# Patient Record
Sex: MALE | Race: WHITE | NOT HISPANIC OR LATINO | Employment: UNEMPLOYED | URBAN - METROPOLITAN AREA
[De-identification: names, ages, dates, MRNs, and addresses within clinical notes are randomized per-mention and may not be internally consistent; named-entity substitution may affect disease eponyms.]

---

## 2019-09-18 ENCOUNTER — HOSPITAL ENCOUNTER (EMERGENCY)
Facility: HOSPITAL | Age: 24
Discharge: HOME | End: 2019-09-18
Attending: EMERGENCY MEDICINE | Admitting: EMERGENCY MEDICINE
Payer: COMMERCIAL

## 2019-09-18 VITALS
SYSTOLIC BLOOD PRESSURE: 132 MMHG | RESPIRATION RATE: 18 BRPM | OXYGEN SATURATION: 98 % | TEMPERATURE: 100.2 F | DIASTOLIC BLOOD PRESSURE: 74 MMHG | HEART RATE: 89 BPM

## 2019-09-18 DIAGNOSIS — Z00.8 ENCOUNTER FOR PSYCHOLOGICAL EVALUATION: Primary | ICD-10-CM

## 2019-09-18 DIAGNOSIS — F25.9 SCHIZO-AFFECTIVE SCHIZOPHRENIA (HCC): ICD-10-CM

## 2019-09-18 LAB
AMPHETAMINES SERPL QL SCN: NEGATIVE
BARBITURATES UR QL: NEGATIVE
BENZODIAZ UR QL: NEGATIVE
COCAINE UR QL: NEGATIVE
METHADONE UR QL: NEGATIVE
OPIATES UR QL SCN: NEGATIVE
PCP UR QL: NEGATIVE
THC UR QL: NEGATIVE

## 2019-09-18 PROCEDURE — 99284 EMERGENCY DEPT VISIT MOD MDM: CPT

## 2019-09-18 PROCEDURE — 99285 EMERGENCY DEPT VISIT HI MDM: CPT | Performed by: EMERGENCY MEDICINE

## 2019-09-18 PROCEDURE — 80307 DRUG TEST PRSMV CHEM ANLYZR: CPT | Performed by: EMERGENCY MEDICINE

## 2019-09-18 PROCEDURE — 82075 ASSAY OF BREATH ETHANOL: CPT | Performed by: EMERGENCY MEDICINE

## 2019-09-18 RX ORDER — QUETIAPINE FUMARATE 300 MG/1
300 TABLET, FILM COATED ORAL
COMMUNITY
Start: 2019-06-07

## 2019-09-18 RX ORDER — CITALOPRAM 20 MG/1
30 TABLET ORAL DAILY
COMMUNITY
Start: 2019-06-07
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
## 2021-06-10 ENCOUNTER — OFFICE VISIT (OUTPATIENT)
Dept: FAMILY MEDICINE CLINIC | Facility: CLINIC | Age: 26
End: 2021-06-10
Payer: COMMERCIAL

## 2021-06-10 DIAGNOSIS — Z20.822 COUGH WITH EXPOSURE TO COVID-19 VIRUS: Primary | ICD-10-CM

## 2021-06-10 DIAGNOSIS — R05.8 COUGH WITH EXPOSURE TO COVID-19 VIRUS: Primary | ICD-10-CM

## 2021-06-10 PROBLEM — Z72.0 TOBACCO ABUSE: Status: ACTIVE | Noted: 2020-05-27

## 2021-06-10 PROCEDURE — 3725F SCREEN DEPRESSION PERFORMED: CPT | Performed by: FAMILY MEDICINE

## 2021-06-10 PROCEDURE — U0005 INFEC AGEN DETEC AMPLI PROBE: HCPCS | Performed by: FAMILY MEDICINE

## 2021-06-10 PROCEDURE — 99214 OFFICE O/P EST MOD 30 MIN: CPT | Performed by: FAMILY MEDICINE

## 2021-06-10 PROCEDURE — U0003 INFECTIOUS AGENT DETECTION BY NUCLEIC ACID (DNA OR RNA); SEVERE ACUTE RESPIRATORY SYNDROME CORONAVIRUS 2 (SARS-COV-2) (CORONAVIRUS DISEASE [COVID-19]), AMPLIFIED PROBE TECHNIQUE, MAKING USE OF HIGH THROUGHPUT TECHNOLOGIES AS DESCRIBED BY CMS-2020-01-R: HCPCS | Performed by: FAMILY MEDICINE

## 2021-06-10 NOTE — PROGRESS NOTES
Assessment/Plan:   Recommend supportive care fluids and rest   Recommend self quarantine till his test results are known  I gave him a note to stay home from work  Follow-up here as needed  Follow-up emergency department should he become significantly short of breath or have any other serious symptoms  Diagnoses and all orders for this visit:    Cough with exposure to COVID-19 virus  -     Novel Coronavirus (COVID-19), PCR [facility] Collected in Office          Subjective:     Patient ID: Fer Cole is a 30 y o  male  Patient presents with:  COVID-19: fever, sore throat, congestion, needs note for work    Symptoms started about 2 days ago  He initially had a fever but that has improved  Review of Systems   Constitutional: Negative  Negative for fatigue and fever  HENT: Positive for sore throat  Negative for congestion  Eyes: Negative  Respiratory: Negative  Negative for cough and shortness of breath  Cardiovascular: Negative  Gastrointestinal: Negative  Endocrine: Negative  Genitourinary: Negative  Musculoskeletal: Negative  Skin: Negative  Allergic/Immunologic: Negative  Neurological: Negative  Hematological: Negative  Psychiatric/Behavioral: Negative  All other systems reviewed and are negative  Objective:     Physical Exam  Vitals signs and nursing note reviewed  Constitutional:       Appearance: He is well-developed  HENT:      Head: Normocephalic and atraumatic  Right Ear: External ear normal       Left Ear: External ear normal       Nose: Nose normal    Eyes:      Conjunctiva/sclera: Conjunctivae normal       Pupils: Pupils are equal, round, and reactive to light  Neck:      Musculoskeletal: Normal range of motion and neck supple  Cardiovascular:      Rate and Rhythm: Normal rate and regular rhythm  Heart sounds: Normal heart sounds  Pulmonary:      Effort: Pulmonary effort is normal       Breath sounds: Normal breath sounds  Abdominal:      General: Bowel sounds are normal       Palpations: Abdomen is soft  Musculoskeletal: Normal range of motion  Skin:     General: Skin is warm and dry  Neurological:      Mental Status: He is alert and oriented to person, place, and time  Deep Tendon Reflexes: Reflexes are normal and symmetric     Psychiatric:         Behavior: Behavior normal

## 2021-06-10 NOTE — LETTER
Gloria 10, 2021     Patient: Pricilla Cole   YOB: 1995   Date of Visit: 6/10/2021       To Whom it May Concern:    Pricilla Cole is under my professional care  He was seen in my office on 6/10/2021  He may return to work on Saturday June 12, 2021 pending his COVID test results       If you have any questions or concerns, please don't hesitate to call           Sincerely,          Yolie Luis DO        CC: No Recipients

## 2021-06-11 ENCOUNTER — TELEPHONE (OUTPATIENT)
Dept: FAMILY MEDICINE CLINIC | Facility: CLINIC | Age: 26
End: 2021-06-11

## 2021-06-11 LAB — SARS-COV-2 RNA RESP QL NAA+PROBE: NEGATIVE

## 2025-06-01 ENCOUNTER — TELEPHONE (OUTPATIENT)
Dept: OTHER | Facility: OTHER | Age: 30
End: 2025-06-01

## 2025-07-12 ENCOUNTER — HOSPITAL ENCOUNTER (EMERGENCY)
Facility: HOSPITAL | Age: 30
End: 2025-07-13
Attending: EMERGENCY MEDICINE | Admitting: EMERGENCY MEDICINE
Payer: MEDICARE

## 2025-07-12 DIAGNOSIS — F25.9 SCHIZOAFFECTIVE DISORDER (HCC): Primary | ICD-10-CM

## 2025-07-12 LAB — ETHANOL EXG-MCNC: 0 MG/DL

## 2025-07-12 PROCEDURE — 99283 EMERGENCY DEPT VISIT LOW MDM: CPT

## 2025-07-12 PROCEDURE — 82075 ASSAY OF BREATH ETHANOL: CPT | Performed by: PHYSICIAN ASSISTANT

## 2025-07-12 PROCEDURE — 99285 EMERGENCY DEPT VISIT HI MDM: CPT | Performed by: PHYSICIAN ASSISTANT

## 2025-07-12 RX ORDER — PALIPERIDONE PALMITATE 234 MG/1.5ML
1.5 INJECTION INTRAMUSCULAR ONCE
COMMUNITY
Start: 2025-03-04

## 2025-07-12 NOTE — ED NOTES
"Pt was provided with dinner tray. Pt stated \"there is heroin in your food, I'm not eating that\"      Princess Avelar RN  07/12/25 5872    "

## 2025-07-12 NOTE — ED NOTES
Spoke with patients mother, Jennie Toure to give an update on her son. Pt mother would like to be notified when the pt receives placement. Call back number is [phone].     Princess Avelar RN  07/12/25 7250
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
## 2025-07-12 NOTE — ED CARE HANDOFF
Emergency Department Sign Out Note        Sign out and transfer of care from Brionna Jones PA-C. See Separate Emergency Department note.     The patient, Barrie Toure, was evaluated by the previous provider for psych eval.    Workup Completed:  N/A.    ED Course / Workup Pending (followup):  201 was signed awaiting placement. Patient signed out to FORREST Davis.        No data recorded                          ED Course as of 07/12/25 2039   Sat Jul 12, 2025   1906 Reported missing, missing for 12 days, brought in by [employer] today.  He has done this before, has not been taking any of his meds.  201 signed, awaiting placement.     Procedures  Medical Decision Making  Amount and/or Complexity of Data Reviewed  Labs: ordered.    Risk  Decision regarding hospitalization.            Disposition  Final diagnoses:   None     ED Disposition       ED Disposition   Transfer to Behavioral Health Condition   --    Date/Time   Sat Jul 12, 2025  7:18 PM    Comment   Barrie Toure should be transferred out to [facility] and has been medically cleared.               Follow-up Information    None       Patient's Medications   Discharge Prescriptions    No medications on file     No discharge procedures on file.       ED Provider  Electronically Signed by     Zaida Boswell PA-C  07/12/25 2039

## 2025-07-13 VITALS
SYSTOLIC BLOOD PRESSURE: 115 MMHG | RESPIRATION RATE: 17 BRPM | HEART RATE: 63 BPM | WEIGHT: 128.75 LBS | OXYGEN SATURATION: 98 % | DIASTOLIC BLOOD PRESSURE: 62 MMHG | TEMPERATURE: 98.1 F

## 2025-07-13 LAB
AMPHETAMINES SERPL QL SCN: NEGATIVE
BARBITURATES UR QL: NEGATIVE
BENZODIAZ UR QL: NEGATIVE
COCAINE UR QL: NEGATIVE
FENTANYL UR QL SCN: NEGATIVE
HYDROCODONE UR QL SCN: NEGATIVE
METHADONE UR QL: NEGATIVE
OPIATES UR QL SCN: NEGATIVE
OXYCODONE+OXYMORPHONE UR QL SCN: NEGATIVE
PCP UR QL: NEGATIVE
THC UR QL: NEGATIVE

## 2025-07-13 PROCEDURE — 80307 DRUG TEST PRSMV CHEM ANLYZR: CPT | Performed by: PHYSICIAN ASSISTANT

## 2025-07-13 NOTE — ED CARE HANDOFF
Emergency Department Sign Out Note        Sign out and transfer of care from GONZALO Collins. See Separate Emergency Department note.     The patient, Barrie Toure, was evaluated by the previous provider for psych.    Workup Completed:  As completed    ED Course / Workup Pending (followup):  201        No data recorded                             Procedures  Medical Decision Making  Amount and/or Complexity of Data Reviewed  Labs: ordered.    Risk  Decision regarding hospitalization.            Disposition  Final diagnoses:   Schizoaffective disorder (HCC)     Time reflects when diagnosis was documented in both MDM as applicable and the Disposition within this note       Time User Action Codes Description Comment    7/12/2025  8:43 PM Susan Collins Add [F25.9] Schizoaffective disorder (HCC)           ED Disposition       ED Disposition   Transfer to Behavioral Health    Condition   --    Date/Time   Sat Jul 12, 2025  7:18 PM    Comment   Barrie Toure should be transferred out to [facility] and has been medically cleared.               Follow-up Information    None       Patient's Medications   Discharge Prescriptions    No medications on file     No discharge procedures on file.       ED Provider  Electronically Signed by     Gagandeep Johnson MD  07/13/25 0656
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
## 2025-07-13 NOTE — ED NOTES
Per Lilliam from [facility], a bed is still available for the patient.  Primary payor is Medicare. The patient is not enrolled in a managed plan. PALAK Dunn is the secondary payor. No prior authorization is necessary at this time.

## 2025-07-13 NOTE — ED NOTES
Mom, tanisha called at this time [phone], no answer-message left    Also tried aunt leigh ann at [phone]- chelsey Warner RN  07/13/25 5641

## 2025-07-13 NOTE — ED NOTES
When asked if he had made a decision regarding [facility] [place], patient stated he had not. He  again stated he was told he would be held here for 72 hours. Explained again that he needs to be actually admitted to a unit prior to signing a 72 hour notice for discharge, and that that is not a guarantee of discharge; that it is still based on the psychiatrist's recommendation. Offered [facility] as well. Continues to stated he will think about it. Continues as irritable. Lunch tray provided.

## 2025-07-13 NOTE — ED NOTES
"Patient awake - came to doorway to ask for a blanket because he was cold. Comfort measures offered and patient requested a sandwich and juice - same given. Patient reports that he \"slept like a rock!\" Patient is oriented to person, place (psychiatric hospital) and time. He denies suicidal or homicidal ideation and he states that he is not hearing or seeing things. He was cooperative with care measures. Was asking to take a shower - informed of need to wait until the morning - is agreeable to same. Presently he is eating and watching television.     Margarita Agarwal RN  07/13/25 0109    "

## 2025-07-13 NOTE — ED NOTES
Per Umberto from Intake, there are no beds available at this time. A bed search will be necessary.

## 2025-07-13 NOTE — ED NOTES
ED Crisis met with patient regarding acceptance at [facility], provided he is willing to go there. He was under the impression that he can leave after 72 hours. Reiterated the terms of a 201 and reminded him that a 302 was the alternative. He stated he would need to wake up completely and think about it. Will reapproach the patient shortly.

## 2025-07-13 NOTE — ED NOTES
Pt provided with warm blankets and lights dimmed for comfort. Pt resting comfortably. No other needs at this time.      Princess Avelar RN  07/12/25 2018

## 2025-07-13 NOTE — ED CARE HANDOFF
Emergency Department Sign Out Note        Sign out and transfer of care from Dr. Johnson. See Separate Emergency Department note.     The patient, Barrie Toure, was evaluated by the previous provider for .    Workup Completed:  201    ED Course / Workup Pending (followup):  Pending placement        No data recorded                             Procedures  Medical Decision Making  Amount and/or Complexity of Data Reviewed  Labs: ordered.    Risk  Decision regarding hospitalization.            Disposition  Final diagnoses:   Schizoaffective disorder (HCC)     Time reflects when diagnosis was documented in both MDM as applicable and the Disposition within this note       Time User Action Codes Description Comment    7/12/2025  8:43 PM Susan Collins Add [F25.9] Schizoaffective disorder (HCC)           ED Disposition       ED Disposition   Transfer to Behavioral Health    Condition   --    Date/Time   Sat Jul 12, 2025  7:18 PM    Comment   Barrie Toure should be transferred out to D and has been medically cleared.               Follow-up Information    None       Patient's Medications   Discharge Prescriptions    No medications on file     No discharge procedures on file.       ED Provider  Electronically Signed by     Titi Lee MD  07/13/25

## 2025-07-13 NOTE — EMTALA/ACUTE CARE TRANSFER
[facility] EMERGENCY DEPARTMENT  [address] [facility] [address]  [phone]  Dept: [phone]      EMTALA TRANSFER CONSENT    NAME Barrie Toure                                         1995                              MRN [id number]    I have been informed of my rights regarding examination, treatment, and transfer   by Dr. Titi Lee MD    Benefits: Continuity of care    Risks: Potential for delay in receiving treatment      Consent for Transfer:  I acknowledge that my medical condition has been evaluated and explained to me by the emergency department physician or other qualified medical person and/or my attending physician, who has recommended that I be transferred to the service of  Accepting Physician: Sascha Paul MD at Accepting Facility Name, City & State : [address] [address]. The above potential benefits of such transfer, the potential risks associated with such transfer, and the probable risks of not being transferred have been explained to me, and I fully understand them.  The doctor has explained that, in my case, the benefits of transfer outweigh the risks.  I agree to be transferred.    I authorize the performance of emergency medical procedures and treatments upon me in both transit and upon arrival at the receiving facility.  Additionally, I authorize the release of any and all medical records to the receiving facility and request they be transported with me, if possible.  I understand that the safest mode of transportation during a medical emergency is an ambulance and that the Hospital advocates the use of this mode of transport. Risks of traveling to the receiving facility by car, including absence of medical control, life sustaining equipment, such as oxygen, and medical personnel has been explained to me and I fully understand them.    (RICARDO CORRECT BOX BELOW)  [  ]  I consent to the stated transfer and to be  transported by ambulance/helicopter.  [  ]  I consent to the stated transfer, but refuse transportation by ambulance and accept full responsibility for my transportation by car.  I understand the risks of non-ambulance transfers and I exonerate the Hospital and its staff from any deterioration in my condition that results from this refusal.    X___________________________________________    DATE  25  TIME________  Signature of patient or legally responsible individual signing on patient behalf           RELATIONSHIP TO PATIENT_________________________          Provider Certification    NAME Barrie Toure                                         1995                              MRN [id number]    A medical screening exam was performed on the above named patient.  Based on the examination:    Condition Necessitating Transfer The encounter diagnosis was Schizoaffective disorder (HCC).    Patient Condition: The patient has been stabilized such that within reasonable medical probability, no material deterioration of the patient condition or the condition of the unborn child(wilfred) is likely to result from the transfer    Reason for Transfer: No bed available at level of patient's needs    Transfer Requirements: Facility [facility], [address] [place] [id number]   Space available and qualified personnel available for treatment as acknowledged by Sofía/ [phone]  Agreed to accept transfer and to provide appropriate medical treatment as acknowledged by       Sascha Paul MD  Appropriate medical records of the examination and treatment of the patient are provided at the time of transfer   STAFF INITIAL WHEN COMPLETED _______  Transfer will be performed by qualified personnel from [facility]/ [phone]  and appropriate transfer equipment as required, including the use of necessary and appropriate life support measures.    Provider Certification: I have examined the patient and explained the  following risks and benefits of being transferred/refusing transfer to the patient/family:  General risk, such as traffic hazards, adverse weather conditions, rough terrain or turbulence, possible failure of equipment (including vehicle or aircraft), or consequences of actions of persons outside the control of the transport personnel      Based on these reasonable risks and benefits to the patient and/or the unborn child(wilfred), and based upon the information available at the time of the patient’s examination, I certify that the medical benefits reasonably to be expected from the provision of appropriate medical treatments at another medical facility outweigh the increasing risks, if any, to the individual’s medical condition, and in the case of labor to the unborn child, from effecting the transfer.    X____________________________________________ DATE 07/13/25        TIME_______      ORIGINAL - SEND TO MEDICAL RECORDS   COPY - SEND WITH PATIENT DURING TRANSFER

## 2025-07-13 NOTE — ED NOTES
Name: NAWAF PAYNE   Recipient ID: [id number]   YOB: 1995   Gender: Male           Eligibility Summary    Type Name   Medicaid Category: MHX  Program Status: 00  Service Program: EPOMS-[facility] Based Funding Only - Non-Medic   [id number]   Medicaid Category: PH  Program Status: 80  Service Program: [id number]-ADULT   Other or Additional Payor MEDICARE PART B   Other or Additional Payor MEDICARE PART A
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
## 2025-07-13 NOTE — ED NOTES
Patient noted to be sleeping with easy non labored respirations on virtual monitoring. No apparent discomfort or distress. Did not awaken patient at this time for vital sign or BH reassessment as allowing patient to get rest as is unclear how much sleep he has gotten over the past few weeks. Will assess when awake.     Margarita Agarwal RN  07/12/25 [id number]

## 2025-07-13 NOTE — ED NOTES
EMTALA and PMN forms completed and signed.  PMN form emailed to [email or web address].  Transfer envelope is completed.

## 2025-07-13 NOTE — ED NOTES
Patient is accepted at [facility].  Patient is accepted by Sascha Paul MD, per Светлана.   Patient can arrive any time.   Transport requested through [employer].    Transportation is arranged with: [facility].  Transportation is scheduled for 1900.   Lilliam from Admissions is aware.    Nurse report is not required.

## 2025-07-13 NOTE — ED NOTES
Patient stated the police told him he would be here in the ED for 72 hours and then he could leave. The terms of a 201 were repeatedly explained by ED Crisis and he continues to focus on what he believes the police told him. Form explaining 201 rights to the patient was provided. He appeared to read it, then try to read the other side, which was blank, bty holding it up to the light. Because he seems to be unable to comprehend the terms of a 201 in his current state of decompensation, a 2MD 302 will be initiated.

## 2025-07-31 PROBLEM — R53.82 CHRONIC FATIGUE: Status: ACTIVE | Noted: 2018-01-22
